# Patient Record
Sex: FEMALE | Race: WHITE | HISPANIC OR LATINO | URBAN - METROPOLITAN AREA
[De-identification: names, ages, dates, MRNs, and addresses within clinical notes are randomized per-mention and may not be internally consistent; named-entity substitution may affect disease eponyms.]

---

## 2021-09-23 ENCOUNTER — OFFICE VISIT (OUTPATIENT)
Dept: URBAN - METROPOLITAN AREA CLINIC 60 | Facility: CLINIC | Age: 76
End: 2021-09-23

## 2021-11-08 ENCOUNTER — OFFICE VISIT (OUTPATIENT)
Dept: URBAN - METROPOLITAN AREA CLINIC 63 | Facility: CLINIC | Age: 76
End: 2021-11-08

## 2022-01-25 LAB — HELICOBACTER PYLORI, UREA BREATH TEST: NOT DETECTED

## 2022-01-26 ENCOUNTER — OFFICE VISIT (OUTPATIENT)
Dept: URBAN - METROPOLITAN AREA CLINIC 63 | Facility: CLINIC | Age: 77
End: 2022-01-26

## 2022-07-09 ENCOUNTER — TELEPHONE ENCOUNTER (OUTPATIENT)
Dept: URBAN - METROPOLITAN AREA CLINIC 121 | Facility: CLINIC | Age: 77
End: 2022-07-09

## 2022-07-09 RX ORDER — ESCITALOPRAM 5 MG/1
TABLET, FILM COATED ORAL
Refills: 0 | OUTPATIENT
Start: 2021-09-16 | End: 2021-11-08

## 2022-07-09 RX ORDER — KETOCONAZOLE 20 MG/G
CREAM TOPICAL
Refills: 0 | OUTPATIENT
Start: 2021-09-16 | End: 2021-11-08

## 2022-07-09 RX ORDER — IRBESARTAN 150 MG/1
TABLET ORAL ONCE A DAY
Refills: 0 | OUTPATIENT
Start: 2021-11-08 | End: 2022-01-26

## 2022-07-09 RX ORDER — ESOMEPRAZOLE MAGNESIUM 20 MG
CAPSULE,DELAYED RELEASE (ENTERIC COATED) ORAL
Refills: 0 | OUTPATIENT
Start: 2021-11-08 | End: 2022-01-26

## 2022-07-09 RX ORDER — TRIAMCINOLONE ACETONIDE 0.25 MG/G
OINTMENT TOPICAL
Refills: 0 | OUTPATIENT
Start: 2021-09-29 | End: 2021-11-08

## 2022-07-09 RX ORDER — SIMETHICONE 62.5 MG
STRIP ORAL AS NEEDED
Refills: 0 | OUTPATIENT
Start: 2021-11-08 | End: 2022-01-26

## 2022-07-09 RX ORDER — FLUTICASONE PROPIONATE 50 UG/1
SPRAY, METERED NASAL
Refills: 0 | OUTPATIENT
Start: 2022-01-12 | End: 2022-01-26

## 2022-07-09 RX ORDER — IRBESARTAN 150 MG/1
TABLET ORAL
Refills: 0 | OUTPATIENT
Start: 2021-09-16 | End: 2021-11-08

## 2022-07-09 RX ORDER — AMLODIPINE BESYLATE 5 MG/1
TABLET ORAL ONCE A DAY
Refills: 0 | OUTPATIENT
Start: 2021-11-08 | End: 2022-01-26

## 2022-07-09 RX ORDER — ESOMEPRAZOLE MAGNESIUM 20 MG
CAPSULE,DELAYED RELEASE (ENTERIC COATED) ORAL
Refills: 0 | OUTPATIENT
Start: 2022-01-26 | End: 2022-01-26

## 2022-07-09 RX ORDER — ALBUTEROL SULFATE 90 UG/1
INHALANT RESPIRATORY (INHALATION)
Refills: 0 | OUTPATIENT
Start: 2022-01-10 | End: 2022-01-26

## 2022-07-09 RX ORDER — CICLOPIROX 80 MG/ML
SOLUTION TOPICAL
Refills: 0 | OUTPATIENT
Start: 2021-11-03 | End: 2021-11-08

## 2022-07-09 RX ORDER — METHYLPREDNISOLONE 4 MG/1
TABLET ORAL
Refills: 0 | OUTPATIENT
Start: 2022-01-19 | End: 2022-01-26

## 2022-07-09 RX ORDER — AMLODIPINE BESYLATE 5 MG/1
TABLET ORAL
Refills: 0 | OUTPATIENT
Start: 2021-09-16 | End: 2021-11-08

## 2022-07-10 ENCOUNTER — TELEPHONE ENCOUNTER (OUTPATIENT)
Dept: URBAN - METROPOLITAN AREA CLINIC 121 | Facility: CLINIC | Age: 77
End: 2022-07-10

## 2022-07-10 RX ORDER — IRBESARTAN 150 MG/1
TABLET ORAL ONCE A DAY
Refills: 0 | Status: ACTIVE | COMMUNITY
Start: 2022-01-26

## 2022-07-10 RX ORDER — SIMETHICONE 62.5 MG
STRIP ORAL AS NEEDED
Refills: 0 | Status: ACTIVE | COMMUNITY
Start: 2022-01-26

## 2022-07-10 RX ORDER — AMLODIPINE BESYLATE 5 MG/1
TABLET ORAL ONCE A DAY
Refills: 0 | Status: ACTIVE | COMMUNITY
Start: 2022-01-26

## 2022-07-10 RX ORDER — ALBUTEROL SULFATE 90 UG/1
INHALANT RESPIRATORY (INHALATION)
Refills: 0 | Status: ACTIVE | COMMUNITY
Start: 2022-01-26

## 2022-07-10 RX ORDER — FLUTICASONE PROPIONATE 50 UG/1
SPRAY, METERED NASAL
Refills: 0 | Status: ACTIVE | COMMUNITY
Start: 2022-01-26

## 2022-07-10 RX ORDER — TIOTROPIUM BROMIDE INHALATION SPRAY 3.12 UG/1
SPRAY, METERED RESPIRATORY (INHALATION)
Refills: 0 | Status: ACTIVE | COMMUNITY
Start: 2022-01-26

## 2022-07-10 RX ORDER — ESCITALOPRAM 5 MG/1
TABLET, FILM COATED ORAL ONCE A DAY
Refills: 0 | Status: ACTIVE | COMMUNITY
Start: 2022-01-26

## 2022-07-10 RX ORDER — METHYLPREDNISOLONE 4 MG/1
TABLET ORAL
Refills: 0 | Status: ACTIVE | COMMUNITY
Start: 2022-01-26

## 2022-07-10 RX ORDER — ESOMEPRAZOLE MAGNESIUM 20 MG
1 EVERY MORNING CAPSULE,DELAYED RELEASE (ENTERIC COATED) ORAL
Refills: 1 | Status: ACTIVE | COMMUNITY
Start: 2022-01-26

## 2022-11-04 ENCOUNTER — WEB ENCOUNTER (OUTPATIENT)
Dept: URBAN - METROPOLITAN AREA CLINIC 60 | Facility: CLINIC | Age: 77
End: 2022-11-04

## 2022-11-04 ENCOUNTER — OFFICE VISIT (OUTPATIENT)
Dept: URBAN - METROPOLITAN AREA CLINIC 60 | Facility: CLINIC | Age: 77
End: 2022-11-04
Payer: COMMERCIAL

## 2022-11-04 VITALS
OXYGEN SATURATION: 96.4 % | WEIGHT: 134 LBS | HEART RATE: 88 BPM | HEIGHT: 63 IN | RESPIRATION RATE: 20 BRPM | BODY MASS INDEX: 23.74 KG/M2 | TEMPERATURE: 96.4 F | SYSTOLIC BLOOD PRESSURE: 120 MMHG | DIASTOLIC BLOOD PRESSURE: 76 MMHG

## 2022-11-04 DIAGNOSIS — K29.50 OTHER CHRONIC GASTRITIS WITHOUT HEMORRHAGE: ICD-10-CM

## 2022-11-04 DIAGNOSIS — Z12.11 SCREEN FOR COLON CANCER: ICD-10-CM

## 2022-11-04 DIAGNOSIS — K29.60 REFLUX GASTRITIS: ICD-10-CM

## 2022-11-04 PROCEDURE — 99214 OFFICE O/P EST MOD 30 MIN: CPT | Performed by: NURSE PRACTITIONER

## 2022-11-04 RX ORDER — AMLODIPINE BESYLATE 5 MG/1
TABLET ORAL ONCE A DAY
Refills: 0 | Status: ACTIVE | COMMUNITY
Start: 2022-01-26

## 2022-11-04 RX ORDER — GABAPENTIN 300 MG/1
1 CAPSULE CAPSULE ORAL ONCE A DAY
Status: ACTIVE | COMMUNITY

## 2022-11-04 RX ORDER — SUCRALFATE 1 G/1
1 TABLET ON AN EMPTY STOMACH TABLET ORAL TWICE A DAY
Qty: 180 TABLET | Refills: 0 | OUTPATIENT
Start: 2022-11-04 | End: 2023-02-01

## 2022-11-04 RX ORDER — PANCRELIPASE 36000; 180000; 114000 [USP'U]/1; [USP'U]/1; [USP'U]/1
AS DIRECTED CAPSULE, DELAYED RELEASE PELLETS ORAL
Status: ACTIVE | COMMUNITY

## 2022-11-04 RX ORDER — POLYVINYL ALCOHOL, POVIDONE 14; 6 MG/ML; MG/ML
AS DIRECTED SOLUTION/ DROPS OPHTHALMIC
Status: ACTIVE | COMMUNITY

## 2022-11-04 RX ORDER — ALBUTEROL SULFATE 90 UG/1
INHALANT RESPIRATORY (INHALATION)
Refills: 0 | Status: ACTIVE | COMMUNITY
Start: 2022-01-26

## 2022-11-04 RX ORDER — IRBESARTAN 150 MG/1
TABLET ORAL ONCE A DAY
Refills: 0 | Status: ACTIVE | COMMUNITY
Start: 2022-01-26

## 2022-11-04 RX ORDER — TIOTROPIUM BROMIDE INHALATION SPRAY 3.12 UG/1
SPRAY, METERED RESPIRATORY (INHALATION)
Refills: 0 | Status: ACTIVE | COMMUNITY
Start: 2022-01-26

## 2022-11-04 RX ORDER — AMOXICILLIN 500 MG/1
1 CAPSULE CAPSULE ORAL
Status: ACTIVE | COMMUNITY

## 2022-11-04 RX ORDER — ESOMEPRAZOLE MAGNESIUM 20 MG
1 EVERY MORNING CAPSULE,DELAYED RELEASE (ENTERIC COATED) ORAL
Refills: 1 | Status: ACTIVE | COMMUNITY
Start: 2022-01-26

## 2022-11-04 RX ORDER — FAMOTIDINE 40 MG/1
1 TABLET AT BEDTIME AS NEEDED TABLET, FILM COATED ORAL ONCE A DAY
Qty: 90 TABLET | Refills: 0 | OUTPATIENT
Start: 2022-11-04

## 2022-11-04 RX ORDER — FLUTICASONE PROPIONATE 50 UG/1
SPRAY, METERED NASAL
Refills: 0 | Status: ACTIVE | COMMUNITY
Start: 2022-01-26

## 2022-11-04 NOTE — HPI-HPI
11/21 Patient seen today at the office with history of IBS s/p cholecystectomy complaining of dyspepsia had an upper endoscopy and colonoscopy in 2019 with evidence of polyp resected, I do have the pathology was done in [facility].  Will recommend repeat colonoscopy in 3 to 5 years from previous one.  In regard of the upper endoscopy patient have gastritis with hiatal hernia I do have again the pathology but have evidence of H. pylori will do we have breast test to check the status if is positive we can do an upper endoscopy and antibiogram. Patient with lactose intolerance. Will continue diet. 1/22: Patient had Urea Breath test that is negative for H pylori. Patient doing well, with good response with creon prn. Will follow as needed basis. Patient will continue low dose PPI. Will follow in 6 months. Patient with GERD on PPI and H pylori resolved will try to wean her off PPI in the near future. 11/22 Patient here today in good general state, he she is complaining of having symptom of gastritis and reflux.  Her symptoms are chronic but now seems to be worse.  Also, patient is a here for her surveillance colonoscopy her last colonoscopy was done 3 years ago with a recall in 3 years.  Patient denies any rectal bleeding or change in her bowel habits. EGD colonoscopy, start on famotidine 40 mg once a day and Carafate 1 g twice a day.  Stop PPI.

## 2022-11-11 ENCOUNTER — LAB OUTSIDE AN ENCOUNTER (OUTPATIENT)
Dept: URBAN - METROPOLITAN AREA CLINIC 60 | Facility: CLINIC | Age: 77
End: 2022-11-11

## 2022-12-05 PROBLEM — 8493009: Status: ACTIVE | Noted: 2022-11-04

## 2022-12-09 ENCOUNTER — CLAIMS CREATED FROM THE CLAIM WINDOW (OUTPATIENT)
Dept: URBAN - METROPOLITAN AREA SURGERY CENTER 4 | Facility: SURGERY CENTER | Age: 77
End: 2022-12-09
Payer: COMMERCIAL

## 2022-12-09 DIAGNOSIS — D12.5 SIGMOID POLYP: ICD-10-CM

## 2022-12-09 DIAGNOSIS — D12.7 BENIGN NEOPLASM OF RECTOSIGMOID JUNCTION: ICD-10-CM

## 2022-12-09 DIAGNOSIS — K64.1 GRADE II INTERNAL HEMORRHOIDS: ICD-10-CM

## 2022-12-09 DIAGNOSIS — D12.3 POLYP OF TRANSVERSE COLON: ICD-10-CM

## 2022-12-09 DIAGNOSIS — Z86.010 COLON POLYP HISTORY: ICD-10-CM

## 2022-12-09 DIAGNOSIS — K57.30 DIVERTCULOSIS OF LG INT W/O PERFORATION OR ABSCESS W/O BLEEDING: ICD-10-CM

## 2022-12-09 PROCEDURE — 45380 COLONOSCOPY AND BIOPSY: CPT | Performed by: INTERNAL MEDICINE

## 2022-12-09 PROCEDURE — 45385 COLONOSCOPY W/LESION REMOVAL: CPT | Performed by: INTERNAL MEDICINE

## 2022-12-09 RX ORDER — AMOXICILLIN 500 MG/1
1 CAPSULE CAPSULE ORAL
Status: ACTIVE | COMMUNITY

## 2022-12-09 RX ORDER — ALBUTEROL SULFATE 90 UG/1
INHALANT RESPIRATORY (INHALATION)
Refills: 0 | Status: ACTIVE | COMMUNITY
Start: 2022-01-26

## 2022-12-09 RX ORDER — TIOTROPIUM BROMIDE INHALATION SPRAY 3.12 UG/1
SPRAY, METERED RESPIRATORY (INHALATION)
Refills: 0 | Status: ACTIVE | COMMUNITY
Start: 2022-01-26

## 2022-12-09 RX ORDER — AMLODIPINE BESYLATE 5 MG/1
TABLET ORAL ONCE A DAY
Refills: 0 | Status: ACTIVE | COMMUNITY
Start: 2022-01-26

## 2022-12-09 RX ORDER — FLUTICASONE PROPIONATE 50 UG/1
SPRAY, METERED NASAL
Refills: 0 | Status: ACTIVE | COMMUNITY
Start: 2022-01-26

## 2022-12-09 RX ORDER — IRBESARTAN 150 MG/1
TABLET ORAL ONCE A DAY
Refills: 0 | Status: ACTIVE | COMMUNITY
Start: 2022-01-26

## 2022-12-09 RX ORDER — FAMOTIDINE 40 MG/1
1 TABLET AT BEDTIME AS NEEDED TABLET, FILM COATED ORAL ONCE A DAY
Qty: 90 TABLET | Refills: 0 | Status: ACTIVE | COMMUNITY
Start: 2022-11-04

## 2022-12-09 RX ORDER — POLYVINYL ALCOHOL, POVIDONE 14; 6 MG/ML; MG/ML
AS DIRECTED SOLUTION/ DROPS OPHTHALMIC
Status: ACTIVE | COMMUNITY

## 2022-12-09 RX ORDER — GABAPENTIN 300 MG/1
1 CAPSULE CAPSULE ORAL ONCE A DAY
Status: ACTIVE | COMMUNITY

## 2022-12-09 RX ORDER — SUCRALFATE 1 G/1
1 TABLET ON AN EMPTY STOMACH TABLET ORAL TWICE A DAY
Qty: 180 TABLET | Refills: 0 | Status: ACTIVE | COMMUNITY
Start: 2022-11-04 | End: 2023-02-01

## 2022-12-09 RX ORDER — PANCRELIPASE 36000; 180000; 114000 [USP'U]/1; [USP'U]/1; [USP'U]/1
AS DIRECTED CAPSULE, DELAYED RELEASE PELLETS ORAL
Status: ACTIVE | COMMUNITY

## 2022-12-09 RX ORDER — ESOMEPRAZOLE MAGNESIUM 20 MG
1 EVERY MORNING CAPSULE,DELAYED RELEASE (ENTERIC COATED) ORAL
Refills: 1 | Status: ACTIVE | COMMUNITY
Start: 2022-01-26

## 2022-12-14 PROBLEM — 398050005 DIVERTICULAR DISEASE OF COLON: Status: ACTIVE | Noted: 2022-12-14

## 2023-01-11 ENCOUNTER — OFFICE VISIT (OUTPATIENT)
Dept: URBAN - METROPOLITAN AREA CLINIC 63 | Facility: CLINIC | Age: 78
End: 2023-01-11
Payer: COMMERCIAL

## 2023-01-11 VITALS
BODY MASS INDEX: 23.04 KG/M2 | HEIGHT: 63 IN | OXYGEN SATURATION: 98 % | HEART RATE: 87 BPM | DIASTOLIC BLOOD PRESSURE: 80 MMHG | TEMPERATURE: 98.2 F | SYSTOLIC BLOOD PRESSURE: 130 MMHG | WEIGHT: 130 LBS

## 2023-01-11 DIAGNOSIS — Z86.010 COLON POLYP HISTORY: ICD-10-CM

## 2023-01-11 DIAGNOSIS — K29.60 REFLUX GASTRITIS: ICD-10-CM

## 2023-01-11 PROBLEM — 428283002 HISTORY OF POLYP OF COLON: Status: ACTIVE | Noted: 2022-12-14

## 2023-01-11 PROBLEM — 57433008: Status: ACTIVE | Noted: 2023-01-11

## 2023-01-11 PROCEDURE — 99213 OFFICE O/P EST LOW 20 MIN: CPT | Performed by: INTERNAL MEDICINE

## 2023-01-11 RX ORDER — GABAPENTIN 300 MG/1
1 CAPSULE CAPSULE ORAL ONCE A DAY
Status: ACTIVE | COMMUNITY

## 2023-01-11 RX ORDER — AMLODIPINE BESYLATE 5 MG/1
1 TABLET TABLET ORAL TWICE A DAY
Refills: 0 | Status: ACTIVE | COMMUNITY
Start: 2022-01-26

## 2023-01-11 RX ORDER — SUCRALFATE 1 G/1
1 TABLET ON AN EMPTY STOMACH TABLET ORAL TWICE A DAY
Qty: 180 TABLET | Refills: 0 | Status: ACTIVE | COMMUNITY
Start: 2022-11-04 | End: 2023-02-01

## 2023-01-11 RX ORDER — FLUTICASONE PROPIONATE 50 UG/1
SPRAY, METERED NASAL
Refills: 0 | Status: ACTIVE | COMMUNITY
Start: 2022-01-26

## 2023-01-11 RX ORDER — ESOMEPRAZOLE MAGNESIUM 20 MG
1 EVERY MORNING CAPSULE,DELAYED RELEASE (ENTERIC COATED) ORAL
Refills: 1 | Status: ACTIVE | COMMUNITY
Start: 2022-01-26

## 2023-01-11 RX ORDER — AMOXICILLIN 500 MG/1
1 CAPSULE CAPSULE ORAL
Status: ACTIVE | COMMUNITY

## 2023-01-11 RX ORDER — PANTOPRAZOLE SODIUM 40 MG/1
1 TABLET TABLET, DELAYED RELEASE ORAL ONCE A DAY
Qty: 30 | Refills: 2 | OUTPATIENT

## 2023-01-11 RX ORDER — PANCRELIPASE 36000; 180000; 114000 [USP'U]/1; [USP'U]/1; [USP'U]/1
AS DIRECTED CAPSULE, DELAYED RELEASE PELLETS ORAL
Status: ACTIVE | COMMUNITY

## 2023-01-11 RX ORDER — FAMOTIDINE 40 MG/1
1 TABLET AT BEDTIME AS NEEDED TABLET, FILM COATED ORAL ONCE A DAY
Qty: 90 TABLET | Refills: 0 | Status: ACTIVE | COMMUNITY
Start: 2022-11-04

## 2023-01-11 RX ORDER — POLYVINYL ALCOHOL, POVIDONE 14; 6 MG/ML; MG/ML
AS DIRECTED SOLUTION/ DROPS OPHTHALMIC
Status: ACTIVE | COMMUNITY

## 2023-01-11 RX ORDER — TIOTROPIUM BROMIDE INHALATION SPRAY 3.12 UG/1
SPRAY, METERED RESPIRATORY (INHALATION)
Refills: 0 | Status: ACTIVE | COMMUNITY
Start: 2022-01-26

## 2023-01-11 RX ORDER — IRBESARTAN 150 MG/1
TABLET ORAL ONCE A DAY
Refills: 0 | Status: ACTIVE | COMMUNITY
Start: 2022-01-26

## 2023-01-11 RX ORDER — ALBUTEROL SULFATE 90 UG/1
INHALANT RESPIRATORY (INHALATION)
Refills: 0 | Status: ACTIVE | COMMUNITY
Start: 2022-01-26

## 2023-01-11 NOTE — HPI-HPI
11/21 Patient seen today at the office with history of IBS s/p cholecystectomy complaining of dyspepsia had an upper endoscopy and colonoscopy in 2019 with evidence of polyp resected, I do have the pathology was done in [facility].  Will recommend repeat colonoscopy in 3 to 5 years from previous one.  In regard of the upper endoscopy patient have gastritis with hiatal hernia I do have again the pathology but have evidence of H. pylori will do we have breast test to check the status if is positive we can do an upper endoscopy and antibiogram. Patient with lactose intolerance. Will continue diet. 1/22: Patient had Urea Breath test that is negative for H pylori. Patient doing well, with good response with creon prn. Will follow as needed basis. Patient will continue low dose PPI. Will follow in 6 months. Patient with GERD on PPI and H pylori resolved will try to wean her off PPI in the near future. 11/22 Patient here today in good general state, he she is complaining of having symptom of gastritis and reflux.  Her symptoms are chronic but now seems to be worse.  Also, patient is a here for her surveillance colonoscopy her last colonoscopy was done 3 years ago with a recall in 3 years.  Patient denies any rectal bleeding or change in her bowel habits. EGD colonoscopy, start on famotidine 40 mg once a day and Carafate 1 g twice a day.  Stop PPI.

## 2023-01-11 NOTE — HPI-TODAY'S VISIT:
1/23: Patient had a upper endoscopy and  colonoscopy.  On December 2022 with impression of distal esophagitis grade B by [place] classification, small hiatal hernia, chronic gastritis, normal duodenum.  Questionable Candida esophagitis, biopsy taken.  Colonoscopy patient was a 10 mm polyp in the rectosigmoid colon removed with hot snare, 5 mm polyp in the sigmoid colon, and 2 small polyp in the hepatic flexure removed with cold biopsy forcep.  2 polyps in the transverse colon removed with cold biopsy forcep.  Mild diverticulosis in the sigmoid colon and internal hemorrhoids pathology report patient was normal small bowel, focal mild chronic gastritis with area of intestinal metaplasia involving antral type mucosa, negative for H. pylori.  Squamous columnar mucosa with reflux changes of active esophagitis and acute and chronic inflammation negative for intestinal metaplasia, dysplasia or malignancy.  Polyps all of them were tubular adenomas was recommended to repeat a colonoscopy in 3 years.  We will do treatment for acid reflux, will consider repeat endoscopy in a year from now because of the finding of intestinal metaplasia.

## 2023-04-25 ENCOUNTER — TELEPHONE ENCOUNTER (OUTPATIENT)
Dept: URBAN - METROPOLITAN AREA CLINIC 64 | Facility: CLINIC | Age: 78
End: 2023-04-25

## 2023-07-25 ENCOUNTER — OFFICE VISIT (OUTPATIENT)
Dept: URBAN - METROPOLITAN AREA CLINIC 63 | Facility: CLINIC | Age: 78
End: 2023-07-25

## 2023-08-09 ENCOUNTER — DASHBOARD ENCOUNTERS (OUTPATIENT)
Age: 78
End: 2023-08-09

## 2023-08-09 ENCOUNTER — WEB ENCOUNTER (OUTPATIENT)
Dept: URBAN - METROPOLITAN AREA CLINIC 63 | Facility: CLINIC | Age: 78
End: 2023-08-09

## 2023-08-09 ENCOUNTER — OFFICE VISIT (OUTPATIENT)
Dept: URBAN - METROPOLITAN AREA CLINIC 63 | Facility: CLINIC | Age: 78
End: 2023-08-09
Payer: COMMERCIAL

## 2023-08-09 ENCOUNTER — LAB OUTSIDE AN ENCOUNTER (OUTPATIENT)
Dept: URBAN - METROPOLITAN AREA CLINIC 63 | Facility: CLINIC | Age: 78
End: 2023-08-09

## 2023-08-09 VITALS
DIASTOLIC BLOOD PRESSURE: 76 MMHG | HEART RATE: 83 BPM | SYSTOLIC BLOOD PRESSURE: 126 MMHG | TEMPERATURE: 96.7 F | OXYGEN SATURATION: 97 % | WEIGHT: 139 LBS | BODY MASS INDEX: 24.63 KG/M2 | HEIGHT: 63 IN

## 2023-08-09 DIAGNOSIS — K64.1 GRADE II HEMORRHOIDS: ICD-10-CM

## 2023-08-09 DIAGNOSIS — Z86.010 COLON POLYP HISTORY: ICD-10-CM

## 2023-08-09 DIAGNOSIS — K29.60 REFLUX GASTRITIS: ICD-10-CM

## 2023-08-09 DIAGNOSIS — K31.A11 INTESTINAL METAPLASIA OF ANTRUM OF STOMACH WITHOUT DYSPLASIA: ICD-10-CM

## 2023-08-09 PROBLEM — 721704005: Status: ACTIVE | Noted: 2023-08-09

## 2023-08-09 PROCEDURE — 99214 OFFICE O/P EST MOD 30 MIN: CPT | Performed by: INTERNAL MEDICINE

## 2023-08-09 RX ORDER — ESOMEPRAZOLE MAGNESIUM 20 MG
1 EVERY MORNING CAPSULE,DELAYED RELEASE (ENTERIC COATED) ORAL
Refills: 1 | Status: ACTIVE | COMMUNITY
Start: 2022-01-26

## 2023-08-09 RX ORDER — POLYVINYL ALCOHOL, POVIDONE 14; 6 MG/ML; MG/ML
AS DIRECTED SOLUTION/ DROPS OPHTHALMIC
Status: ACTIVE | COMMUNITY

## 2023-08-09 RX ORDER — HYDROCORTISONE 25 MG/G
1 APPLICATION CREAM TOPICAL TWICE A DAY
Qty: 1 | Refills: 0 | OUTPATIENT
Start: 2023-08-09

## 2023-08-09 RX ORDER — PANCRELIPASE 36000; 180000; 114000 [USP'U]/1; [USP'U]/1; [USP'U]/1
AS DIRECTED CAPSULE, DELAYED RELEASE PELLETS ORAL
Status: ACTIVE | COMMUNITY

## 2023-08-09 RX ORDER — FLUTICASONE PROPIONATE 50 UG/1
SPRAY, METERED NASAL
Refills: 0 | Status: ACTIVE | COMMUNITY
Start: 2022-01-26

## 2023-08-09 RX ORDER — GABAPENTIN 300 MG/1
1 CAPSULE CAPSULE ORAL ONCE A DAY
Status: ACTIVE | COMMUNITY

## 2023-08-09 RX ORDER — TIOTROPIUM BROMIDE INHALATION SPRAY 3.12 UG/1
SPRAY, METERED RESPIRATORY (INHALATION)
Refills: 0 | Status: ACTIVE | COMMUNITY
Start: 2022-01-26

## 2023-08-09 RX ORDER — PANTOPRAZOLE SODIUM 40 MG/1
1 TABLET TABLET, DELAYED RELEASE ORAL ONCE A DAY
Qty: 30 | Refills: 2 | Status: ACTIVE | COMMUNITY

## 2023-08-09 RX ORDER — AMOXICILLIN 500 MG/1
1 CAPSULE CAPSULE ORAL
Status: ACTIVE | COMMUNITY

## 2023-08-09 RX ORDER — ALBUTEROL SULFATE 90 UG/1
INHALANT RESPIRATORY (INHALATION)
Refills: 0 | Status: ACTIVE | COMMUNITY
Start: 2022-01-26

## 2023-08-09 RX ORDER — AMLODIPINE BESYLATE 5 MG/1
1 TABLET TABLET ORAL TWICE A DAY
Refills: 0 | Status: ACTIVE | COMMUNITY
Start: 2022-01-26

## 2023-08-09 RX ORDER — IRBESARTAN 150 MG/1
TABLET ORAL ONCE A DAY
Refills: 0 | Status: ACTIVE | COMMUNITY
Start: 2022-01-26

## 2023-08-09 RX ORDER — FAMOTIDINE 40 MG/1
1 TABLET AT BEDTIME AS NEEDED TABLET, FILM COATED ORAL ONCE A DAY
Qty: 90 TABLET | Refills: 0 | Status: ACTIVE | COMMUNITY
Start: 2022-11-04

## 2023-08-09 NOTE — HPI-TODAY'S VISIT:
1/23: Patient had a upper endoscopy and  colonoscopy.  On December 2022 with impression of distal esophagitis grade B by [place] classification, small hiatal hernia, chronic gastritis, normal duodenum.  Questionable Candida esophagitis, biopsy taken.  Colonoscopy patient was a 10 mm polyp in the rectosigmoid colon removed with hot snare, 5 mm polyp in the sigmoid colon, and 2 small polyp in the hepatic flexure removed with cold biopsy forcep.  2 polyps in the transverse colon removed with cold biopsy forcep.  Mild diverticulosis in the sigmoid colon and internal hemorrhoids pathology report patient was normal small bowel, focal mild chronic gastritis with area of intestinal metaplasia involving antral type mucosa, negative for H. pylori.  Squamous columnar mucosa with reflux changes of active esophagitis and acute and chronic inflammation negative for intestinal metaplasia, dysplasia or malignancy.  Polyps all of them were tubular adenomas was recommended to repeat a colonoscopy in 3 years.  We will do treatment for acid reflux, will consider repeat endoscopy in a year from now because of the finding of intestinal metaplasia. 8/23: Patient comes with symptoms of reflux and she is doing her diet,  with epigastric discomfort and bloating, will do EGD and will continue nexium and will do topical treatment for hemorrhoids. (states had irritation of the hemorrhoids)  Will folow closely. Patient states having regular BM. WIll conisder stool studies if patient recur with episode of diarrhea, that now has resolved. But has chronic dyspepsia. With personal h/o intestinal metaplasia will do EGD.

## 2023-08-09 NOTE — PHYSICAL EXAM GASTROINTESTINAL
Abdomen , soft, nontender, mild discomfort in the epigastrium to deep palpation, nondistended , no guarding or rigidity , no masses palpable , normal bowel sounds , Liver and Spleen,  no hepatosplenomegaly , liver nontender

## 2023-08-09 NOTE — HPI-HPI
11/21 Patient seen today at the office with history of IBS s/p cholecystectomy complaining of dyspepsia had an upper endoscopy and colonoscopy in 2019 with evidence of polyp resected, I do have the pathology was done in [facility].  Will recommend repeat colonoscopy in 3 to 5 years from previous one.  In regard of the upper endoscopy patient have gastritis with hiatal hernia I do have again the pathology but have evidence of H. pylori will do we have breast test to check the status if is positive we can do an upper endoscopy and antibiogram. Patient with lactose intolerance. Will continue diet. 1/22: Patient had Urea Breath test that is negative for H pylori. Patient doing well, with good response with creon prn. Will follow as needed basis. Patient will continue low dose PPI. Will follow in 6 months. Patient with GERD on PPI and H pylori resolved will try to wean her off PPI in the near future. 11/22 Patient here today in good general state, he she is complaining of having symptom of gastritis and reflux.  Her symptoms are chronic but now seems to be worse.  Also, patient is a here for her surveillance colonoscopy her last colonoscopy was done 3 years ago with a recall in 3 years.  Patient denies any rectal bleeding or change in her bowel habits. EGD colonoscopy, start on famotidine 40 mg once a day and Carafate 1 g twice a day.  Stop PPI.

## 2023-09-29 ENCOUNTER — OFFICE VISIT (OUTPATIENT)
Dept: URBAN - METROPOLITAN AREA SURGERY CENTER 4 | Facility: SURGERY CENTER | Age: 78
End: 2023-09-29
Payer: COMMERCIAL

## 2023-09-29 DIAGNOSIS — K29.60 REFLUX GASTRITIS: ICD-10-CM

## 2023-09-29 DIAGNOSIS — R68.89 ERYTHEMATOUS MUCOSA: ICD-10-CM

## 2023-09-29 DIAGNOSIS — K21.9 GASTRO-ESOPHAGEAL REFLUX DISEASE WITHOUT ESOPHAGITIS: ICD-10-CM

## 2023-09-29 DIAGNOSIS — K21.9 ESOPHAGEAL REFLUX: ICD-10-CM

## 2023-09-29 DIAGNOSIS — K29.50 OTHER CHRONIC GASTRITIS WITHOUT HEMORRHAGE: ICD-10-CM

## 2023-09-29 DIAGNOSIS — K44.9 HIATAL HERNIA: ICD-10-CM

## 2023-09-29 DIAGNOSIS — R19.8 OTHER SPECIFIED SYMPTOMS AND SIGNS INVOLVING THE DIGESTIVE SYSTEM AND ABDOMEN: ICD-10-CM

## 2023-09-29 PROCEDURE — 43239 EGD BIOPSY SINGLE/MULTIPLE: CPT | Performed by: INTERNAL MEDICINE

## 2023-09-29 PROCEDURE — 99100 ANES PT EXTEME AGE<1 YR&>70: CPT | Performed by: NURSE ANESTHETIST, CERTIFIED REGISTERED

## 2023-09-29 PROCEDURE — 00731 ANES UPR GI NDSC PX NOS: CPT | Performed by: NURSE ANESTHETIST, CERTIFIED REGISTERED

## 2023-09-29 RX ORDER — FAMOTIDINE 40 MG/1
1 TABLET AT BEDTIME AS NEEDED TABLET, FILM COATED ORAL ONCE A DAY
Qty: 90 TABLET | Refills: 0 | Status: ACTIVE | COMMUNITY
Start: 2022-11-04

## 2023-09-29 RX ORDER — AMLODIPINE BESYLATE 5 MG/1
1 TABLET TABLET ORAL TWICE A DAY
Refills: 0 | Status: ACTIVE | COMMUNITY
Start: 2022-01-26

## 2023-09-29 RX ORDER — GABAPENTIN 300 MG/1
1 CAPSULE CAPSULE ORAL ONCE A DAY
Status: ACTIVE | COMMUNITY

## 2023-09-29 RX ORDER — AMOXICILLIN 500 MG/1
1 CAPSULE CAPSULE ORAL
Status: ACTIVE | COMMUNITY

## 2023-09-29 RX ORDER — PANTOPRAZOLE SODIUM 40 MG/1
1 TABLET TABLET, DELAYED RELEASE ORAL ONCE A DAY
Qty: 30 | Refills: 2 | Status: ACTIVE | COMMUNITY

## 2023-09-29 RX ORDER — POLYVINYL ALCOHOL, POVIDONE 14; 6 MG/ML; MG/ML
AS DIRECTED SOLUTION/ DROPS OPHTHALMIC
Status: ACTIVE | COMMUNITY

## 2023-09-29 RX ORDER — HYDROCORTISONE 25 MG/G
1 APPLICATION CREAM TOPICAL TWICE A DAY
Qty: 1 | Refills: 0 | Status: ACTIVE | COMMUNITY
Start: 2023-08-09

## 2023-09-29 RX ORDER — ESOMEPRAZOLE MAGNESIUM 20 MG
1 EVERY MORNING CAPSULE,DELAYED RELEASE (ENTERIC COATED) ORAL
Refills: 1 | Status: ACTIVE | COMMUNITY
Start: 2022-01-26

## 2023-09-29 RX ORDER — IRBESARTAN 150 MG/1
TABLET ORAL ONCE A DAY
Refills: 0 | Status: ACTIVE | COMMUNITY
Start: 2022-01-26

## 2023-09-29 RX ORDER — ALBUTEROL SULFATE 90 UG/1
INHALANT RESPIRATORY (INHALATION)
Refills: 0 | Status: ACTIVE | COMMUNITY
Start: 2022-01-26

## 2023-09-29 RX ORDER — FLUTICASONE PROPIONATE 50 UG/1
SPRAY, METERED NASAL
Refills: 0 | Status: ACTIVE | COMMUNITY
Start: 2022-01-26

## 2023-09-29 RX ORDER — TIOTROPIUM BROMIDE INHALATION SPRAY 3.12 UG/1
SPRAY, METERED RESPIRATORY (INHALATION)
Refills: 0 | Status: ACTIVE | COMMUNITY
Start: 2022-01-26

## 2023-09-29 RX ORDER — PANCRELIPASE 36000; 180000; 114000 [USP'U]/1; [USP'U]/1; [USP'U]/1
AS DIRECTED CAPSULE, DELAYED RELEASE PELLETS ORAL
Status: ACTIVE | COMMUNITY

## 2023-10-17 ENCOUNTER — OFFICE VISIT (OUTPATIENT)
Dept: URBAN - METROPOLITAN AREA CLINIC 64 | Facility: CLINIC | Age: 78
End: 2023-10-17

## 2024-06-20 ENCOUNTER — TELEPHONE ENCOUNTER (OUTPATIENT)
Dept: URBAN - METROPOLITAN AREA CLINIC 63 | Facility: CLINIC | Age: 79
End: 2024-06-20

## 2024-06-26 ENCOUNTER — OFFICE VISIT (OUTPATIENT)
Dept: URBAN - METROPOLITAN AREA CLINIC 60 | Facility: CLINIC | Age: 79
End: 2024-06-26

## 2024-08-19 ENCOUNTER — OFFICE VISIT (OUTPATIENT)
Dept: URBAN - METROPOLITAN AREA CLINIC 60 | Facility: CLINIC | Age: 79
End: 2024-08-19

## 2024-09-25 ENCOUNTER — OFFICE VISIT (OUTPATIENT)
Dept: URBAN - METROPOLITAN AREA CLINIC 63 | Facility: CLINIC | Age: 79
End: 2024-09-25
Payer: COMMERCIAL

## 2024-09-25 VITALS
WEIGHT: 137 LBS | BODY MASS INDEX: 24.27 KG/M2 | HEIGHT: 63 IN | TEMPERATURE: 98 F | OXYGEN SATURATION: 98 % | HEART RATE: 97 BPM | SYSTOLIC BLOOD PRESSURE: 120 MMHG | DIASTOLIC BLOOD PRESSURE: 76 MMHG

## 2024-09-25 DIAGNOSIS — K57.30 DIVERTCULOSIS OF LG INT W/O PERFORATION OR ABSCESS W/O BLEEDING: ICD-10-CM

## 2024-09-25 DIAGNOSIS — K29.60 REFLUX GASTRITIS: ICD-10-CM

## 2024-09-25 DIAGNOSIS — K31.A11 INTESTINAL METAPLASIA OF ANTRUM OF STOMACH WITHOUT DYSPLASIA: ICD-10-CM

## 2024-09-25 PROCEDURE — 99214 OFFICE O/P EST MOD 30 MIN: CPT | Performed by: INTERNAL MEDICINE

## 2024-09-25 RX ORDER — FLUTICASONE PROPIONATE 50 UG/1
SPRAY, METERED NASAL
Refills: 0 | Status: ACTIVE | COMMUNITY
Start: 2022-01-26

## 2024-09-25 RX ORDER — HYDROCORTISONE 25 MG/G
1 APPLICATION CREAM TOPICAL TWICE A DAY
Qty: 1 | Refills: 0 | Status: ACTIVE | COMMUNITY
Start: 2023-08-09

## 2024-09-25 RX ORDER — IRBESARTAN 150 MG/1
TABLET ORAL ONCE A DAY
Refills: 0 | Status: ACTIVE | COMMUNITY
Start: 2022-01-26

## 2024-09-25 RX ORDER — FAMOTIDINE 40 MG/1
1 TABLET AT BEDTIME AS NEEDED TABLET, FILM COATED ORAL ONCE A DAY
Qty: 90 TABLET | Refills: 0 | Status: ACTIVE | COMMUNITY
Start: 2022-11-04

## 2024-09-25 RX ORDER — AMOXICILLIN 500 MG/1
1 CAPSULE CAPSULE ORAL
Status: ACTIVE | COMMUNITY

## 2024-09-25 RX ORDER — POLYVINYL ALCOHOL, POVIDONE 14; 6 MG/ML; MG/ML
AS DIRECTED SOLUTION/ DROPS OPHTHALMIC
Status: ACTIVE | COMMUNITY

## 2024-09-25 RX ORDER — ESOMEPRAZOLE MAGNESIUM 20 MG/1
1 EVERY MORNING CAPSULE, DELAYED RELEASE ORAL
Refills: 1 | Status: ACTIVE | COMMUNITY
Start: 2022-01-26

## 2024-09-25 RX ORDER — SUCRALFATE 1 G/1
1 TABLET ON AN EMPTY STOMACH TABLET ORAL TWICE A DAY
Qty: 180 TABLET | Refills: 3 | OUTPATIENT
Start: 2024-09-25 | End: 2025-09-20

## 2024-09-25 RX ORDER — TIOTROPIUM BROMIDE INHALATION SPRAY 3.12 UG/1
SPRAY, METERED RESPIRATORY (INHALATION)
Refills: 0 | Status: ACTIVE | COMMUNITY
Start: 2022-01-26

## 2024-09-25 RX ORDER — PANCRELIPASE 36000; 180000; 114000 [USP'U]/1; [USP'U]/1; [USP'U]/1
AS DIRECTED CAPSULE, DELAYED RELEASE PELLETS ORAL
Status: ACTIVE | COMMUNITY

## 2024-09-25 RX ORDER — PANTOPRAZOLE SODIUM 40 MG/1
1 TABLET TABLET, DELAYED RELEASE ORAL ONCE A DAY
Qty: 30 | Refills: 2 | Status: ACTIVE | COMMUNITY

## 2024-09-25 RX ORDER — GABAPENTIN 300 MG/1
1 CAPSULE CAPSULE ORAL ONCE A DAY
Status: ACTIVE | COMMUNITY

## 2024-09-25 RX ORDER — AMLODIPINE BESYLATE 5 MG/1
1 TABLET TABLET ORAL TWICE A DAY
Refills: 0 | Status: ACTIVE | COMMUNITY
Start: 2022-01-26

## 2024-09-25 RX ORDER — ALBUTEROL SULFATE 90 UG/1
INHALANT RESPIRATORY (INHALATION)
Refills: 0 | Status: ACTIVE | COMMUNITY
Start: 2022-01-26

## 2024-09-25 RX ORDER — ESOMEPRAZOLE MAGNESIUM 20 MG/1
1 EVERY MORNING CAPSULE, DELAYED RELEASE ORAL
Qty: 30 | Refills: 3 | OUTPATIENT
Start: 2022-01-26

## 2024-09-25 NOTE — HPI-HPI
11/21 Patient seen today at the office with history of IBS s/p cholecystectomy complaining of dyspepsia had an upper endoscopy and colonoscopy in 2019 with evidence of polyp resected, I do have the pathology was done in [facility].  Will recommend repeat colonoscopy in 3 to 5 years from previous one.  In regard of the upper endoscopy patient have gastritis with hiatal hernia I do have again the pathology but have evidence of H. pylori will do we have breast test to check the status if is positive we can do an upper endoscopy and antibiogram. Patient with lactose intolerance. Will continue diet. 1/22: Patient had Urea Breath test that is negative for H pylori. Patient doing well, with good response with creon prn. Will follow as needed basis. Patient will continue low dose PPI. Will follow in 6 months. Patient with GERD on PPI and H pylori resolved will try to wean her off PPI in the near future. 11/22 Patient here today in good general state, he she is complaining of having symptom of gastritis and reflux.  Her symptoms are chronic but now seems to be worse.  Also, patient is a here for her surveillance colonoscopy her last colonoscopy was done 3 years ago with a recall in 3 years.  Patient denies any rectal bleeding or change in her bowel habits. EGD colonoscopy, start on famotidine 40 mg once a day and Carafate 1 g twice a day.  Stop PPI.

## 2024-09-25 NOTE — HPI-TODAY'S VISIT:
1/23: Patient had a upper endoscopy and  colonoscopy.  On December 2022 with impression of distal esophagitis grade B by [place] classification, small hiatal hernia, chronic gastritis, normal duodenum.  Questionable Candida esophagitis, biopsy taken.  Colonoscopy patient was a 10 mm polyp in the rectosigmoid colon removed with hot snare, 5 mm polyp in the sigmoid colon, and 2 small polyp in the hepatic flexure removed with cold biopsy forcep.  2 polyps in the transverse colon removed with cold biopsy forcep.  Mild diverticulosis in the sigmoid colon and internal hemorrhoids pathology report patient was normal small bowel, focal mild chronic gastritis with area of intestinal metaplasia involving antral type mucosa, negative for H. pylori.  Squamous columnar mucosa with reflux changes of active esophagitis and acute and chronic inflammation negative for intestinal metaplasia, dysplasia or malignancy.  Polyps all of them were tubular adenomas was recommended to repeat a colonoscopy in 3 years.  We will do treatment for acid reflux, will consider repeat endoscopy in a year from now because of the finding of intestinal metaplasia. 8/23: Patient comes with symptoms of reflux and she is doing her diet,  with epigastric discomfort and bloating, will do EGD and will continue nexium and will do topical treatment for hemorrhoids. (states had irritation of the hemorrhoids)  Will folow closely. Patient states having regular BM. WIll conisder stool studies if patient recur with episode of diarrhea, that now has resolved. But has chronic dyspepsia. With personal h/o intestinal metaplasia will do EGD. 9/24: Patient had an upper endoscopy done in September 2023 with impression of a normal esophagus, small hiatal hernia, chronic bile gastritis, and normal duodenum.  Pathology report shows small bowel duodenal type mucosa with normal villous pattern and without a specific histopathological findings.  Focal mild chronic gastritis with focal intestinal metaplasia negative for H. pylori at the antrum and biopsy of the body of the stomach show focal minimal chronic gastritis without intestinal metaplasia, negative for H. pylori.  Distal esophagus with squamous mucosa with mild reflux type changes and focal mild chronic inflammation negative for intestinal metaplasia.  With this finding patient have focal intestinal metaplasia of the antrum of the stomach with evidence of bile reflux we will do treatment and may consider to repeat an endoscopy in 1 to 3 years from last endoscopy for surveillance.  Understanding that there is no dysplasia on her last endoscopy.  Mapping of the stomach show evidence of intestinal metaplasia at the antrum of the stomach. Patient with flares of gastritis when she drinks wine.

## 2025-01-27 ENCOUNTER — TELEPHONE ENCOUNTER (OUTPATIENT)
Dept: URBAN - METROPOLITAN AREA CLINIC 63 | Facility: CLINIC | Age: 80
End: 2025-01-27

## 2025-01-27 ENCOUNTER — LAB OUTSIDE AN ENCOUNTER (OUTPATIENT)
Dept: URBAN - METROPOLITAN AREA CLINIC 63 | Facility: CLINIC | Age: 80
End: 2025-01-27

## 2025-01-27 ENCOUNTER — OFFICE VISIT (OUTPATIENT)
Dept: URBAN - METROPOLITAN AREA CLINIC 63 | Facility: CLINIC | Age: 80
End: 2025-01-27
Payer: COMMERCIAL

## 2025-01-27 VITALS
TEMPERATURE: 98.7 F | BODY MASS INDEX: 23.04 KG/M2 | OXYGEN SATURATION: 98 % | DIASTOLIC BLOOD PRESSURE: 89 MMHG | WEIGHT: 130 LBS | SYSTOLIC BLOOD PRESSURE: 130 MMHG | HEART RATE: 96 BPM | HEIGHT: 63 IN

## 2025-01-27 DIAGNOSIS — K31.A11 INTESTINAL METAPLASIA OF ANTRUM OF STOMACH WITHOUT DYSPLASIA: ICD-10-CM

## 2025-01-27 DIAGNOSIS — K29.60 REFLUX GASTRITIS: ICD-10-CM

## 2025-01-27 DIAGNOSIS — Z12.11 COLON CANCER SCREENING: ICD-10-CM

## 2025-01-27 PROCEDURE — 99214 OFFICE O/P EST MOD 30 MIN: CPT | Performed by: INTERNAL MEDICINE

## 2025-01-27 RX ORDER — OMEGA-3-ACID ETHYL ESTERS 1 G/1
TAKE 1 CAPSULE BY MOUTH BY MOUTH IN THE MORNING AND AT BEDTIME CAPSULE, LIQUID FILLED ORAL
Qty: 60 EACH | Refills: 0 | Status: ACTIVE | COMMUNITY

## 2025-01-27 RX ORDER — SUCRALFATE 1 G/1
TAKE 1 TABLET BY MOUTH TWICE DAILY ON AN EMPTY STOMACH TABLET ORAL
Qty: 180 EACH | Refills: 1 | Status: ACTIVE | COMMUNITY

## 2025-01-27 RX ORDER — ESOMEPRAZOLE MAGNESIUM 40 MG/1
CAPSULE, DELAYED RELEASE ORAL
Qty: 90 EACH | Refills: 0 | Status: ACTIVE | COMMUNITY

## 2025-01-27 RX ORDER — IRBESARTAN AND HYDROCHLOROTHIAZIDE 150; 12.5 MG/1; MG/1
TAKE 1 TABLET BY MOUTH 1 TIME EACH DAY TABLET ORAL
Qty: 90 EACH | Refills: 0 | Status: ACTIVE | COMMUNITY

## 2025-01-27 RX ORDER — OMEGA-3-ACID ETHYL ESTERS 1 G/1
CAPSULE, LIQUID FILLED ORAL
Qty: 60 APPLICATOR | Refills: 0 | Status: ACTIVE | COMMUNITY

## 2025-01-27 RX ORDER — FLUTICASONE FUROATE 100 UG/1
POWDER RESPIRATORY (INHALATION)
Qty: 30 APPLICATOR | Refills: 4 | Status: ACTIVE | COMMUNITY

## 2025-01-27 RX ORDER — NEOMYCIN SULFATE, POLYMYXIN B SULFATE, AND DEXAMETHASONE 3.5; 10000; 1 MG/G; [USP'U]/G; MG/G
APPLY TO BOTH EYE LID MARGINS AT BEDTIME OINTMENT OPHTHALMIC
Qty: 3.5 GRAM | Refills: 0 | Status: ACTIVE | COMMUNITY

## 2025-01-27 RX ORDER — AMLODIPINE BESYLATE 5 MG/1
TABLET ORAL
Qty: 90 EACH | Refills: 0 | Status: ACTIVE | COMMUNITY

## 2025-01-27 RX ORDER — FLUTICASONE FUROATE 100 UG/1
INHALE 1 PUFF BY MOUTH 1 TIME EACH DAY. RINSE MOUTH WITH WATER AFTER USE FOR AFTERTASTE AND INCIDENCE OF CANDIDIASIS. DO NOT SWALLOW POWDER RESPIRATORY (INHALATION)
Qty: 30 EACH | Refills: 4 | Status: ACTIVE | COMMUNITY

## 2025-01-27 NOTE — HPI-TODAY'S VISIT:
1/23: Patient had a upper endoscopy and  colonoscopy.  On December 2022 with impression of distal esophagitis grade B by [place] classification, small hiatal hernia, chronic gastritis, normal duodenum.  Questionable Candida esophagitis, biopsy taken.  Colonoscopy patient was a 10 mm polyp in the rectosigmoid colon removed with hot snare, 5 mm polyp in the sigmoid colon, and 2 small polyp in the hepatic flexure removed with cold biopsy forcep.  2 polyps in the transverse colon removed with cold biopsy forcep.  Mild diverticulosis in the sigmoid colon and internal hemorrhoids pathology report patient was normal small bowel, focal mild chronic gastritis with area of intestinal metaplasia involving antral type mucosa, negative for H. pylori.  Squamous columnar mucosa with reflux changes of active esophagitis and acute and chronic inflammation negative for intestinal metaplasia, dysplasia or malignancy.  Polyps all of them were tubular adenomas was recommended to repeat a colonoscopy in 3 years.  We will do treatment for acid reflux, will consider repeat endoscopy in a year from now because of the finding of intestinal metaplasia. 8/23: Patient comes with symptoms of reflux and she is doing her diet,  with epigastric discomfort and bloating, will do EGD and will continue nexium and will do topical treatment for hemorrhoids. (states had irritation of the hemorrhoids)  Will folow closely. Patient states having regular BM. WIll conisder stool studies if patient recur with episode of diarrhea, that now has resolved. But has chronic dyspepsia. With personal h/o intestinal metaplasia will do EGD. 9/24: Patient had an upper endoscopy done in September 2023 with impression of a normal esophagus, small hiatal hernia, chronic bile gastritis, and normal duodenum.  Pathology report shows small bowel duodenal type mucosa with normal villous pattern and without a specific histopathological findings.  Focal mild chronic gastritis with focal intestinal metaplasia negative for H. pylori at the antrum and biopsy of the body of the stomach show focal minimal chronic gastritis without intestinal metaplasia, negative for H. pylori.  Distal esophagus with squamous mucosa with mild reflux type changes and focal mild chronic inflammation negative for intestinal metaplasia.  With this finding patient have focal intestinal metaplasia of the antrum of the stomach with evidence of bile reflux we will do treatment and may consider to repeat an endoscopy in 1 to 3 years from last endoscopy for surveillance.  Understanding that there is no dysplasia on her last endoscopy.  Mapping of the stomach show evidence of intestinal metaplasia at the antrum of the stomach. Patient with flares of gastritis when she drinks wine. 1/25; Patient with occasional dyspepsia and with personal h/o gastric intestinal metaplasia, will continue treatment and will do EGD/ [place] follow closely.

## 2025-01-27 NOTE — HPI-HPI
11/21 Patient seen today at the office with history of IBS s/p cholecystectomy complaining of dyspepsia had an upper endoscopy and colonoscopy in 2019 with evidence of polyp resected, I do have the pathology was done in [facility].  Will recommend repeat colonoscopy in 3 to 5 years from previous one.  In regard of the upper endoscopy patient have gastritis with hiatal hernia I do have again the pathology but have evidence of H. pylori will do we have breast test to check the status if is positive we can do an upper endoscopy and antibiogram. Patient with lactose intolerance. Will continue diet. 1/22: Patient had Urea Breath test that is negative for H pylori. Patient doing well, with good response with creon prn. Will follow as needed basis. Patient will continue low dose PPI. Will follow in 6 months. Patient with GERD on PPI and H pylori resolved will try to wean her off PPI in the near future. 11/22 Patient here today in good general state, he she is complaining of having symptom of gastritis and reflux.  Her symptoms are chronic but now seems to be worse.  Also, patient is a here for her surveillance colonoscopy her last colonoscopy was done 3 years ago with a recall in 3 years.  Patient denies any rectal bleeding or change in her bowel habits. EGD colonoscopy, start on famotidine 40 mg once a day and Carafate 1 g twice a day.  Stop PPI.

## 2025-01-28 ENCOUNTER — LAB OUTSIDE AN ENCOUNTER (OUTPATIENT)
Dept: URBAN - METROPOLITAN AREA CLINIC 63 | Facility: CLINIC | Age: 80
End: 2025-01-28

## 2025-04-08 ENCOUNTER — TELEPHONE ENCOUNTER (OUTPATIENT)
Dept: URBAN - METROPOLITAN AREA CLINIC 63 | Facility: CLINIC | Age: 80
End: 2025-04-08

## 2025-04-15 ENCOUNTER — OFFICE VISIT (OUTPATIENT)
Dept: URBAN - METROPOLITAN AREA SURGERY CENTER 4 | Facility: SURGERY CENTER | Age: 80
End: 2025-04-15

## 2025-06-16 ENCOUNTER — OFFICE VISIT (OUTPATIENT)
Dept: URBAN - METROPOLITAN AREA CLINIC 63 | Facility: CLINIC | Age: 80
End: 2025-06-16

## 2025-07-02 ENCOUNTER — OFFICE VISIT (OUTPATIENT)
Dept: URBAN - METROPOLITAN AREA SURGERY CENTER 4 | Facility: SURGERY CENTER | Age: 80
End: 2025-07-02

## 2025-07-02 RX ORDER — OMEGA-3-ACID ETHYL ESTERS 1 G/1
TAKE 1 CAPSULE BY MOUTH BY MOUTH IN THE MORNING AND AT BEDTIME CAPSULE, LIQUID FILLED ORAL
Qty: 60 EACH | Refills: 0 | Status: ACTIVE | COMMUNITY

## 2025-07-02 RX ORDER — SUCRALFATE 1 G/1
TAKE 1 TABLET BY MOUTH TWICE DAILY ON AN EMPTY STOMACH TABLET ORAL
Qty: 180 EACH | Refills: 1 | Status: ACTIVE | COMMUNITY

## 2025-07-02 RX ORDER — FLUTICASONE FUROATE 100 UG/1
POWDER RESPIRATORY (INHALATION)
Qty: 30 APPLICATOR | Refills: 4 | Status: ACTIVE | COMMUNITY

## 2025-07-02 RX ORDER — AMLODIPINE BESYLATE 5 MG/1
TABLET ORAL
Qty: 90 EACH | Refills: 0 | Status: ACTIVE | COMMUNITY

## 2025-07-02 RX ORDER — ESOMEPRAZOLE MAGNESIUM 40 MG/1
CAPSULE, DELAYED RELEASE ORAL
Qty: 90 EACH | Refills: 0 | Status: ACTIVE | COMMUNITY

## 2025-07-02 RX ORDER — NEOMYCIN SULFATE, POLYMYXIN B SULFATE, AND DEXAMETHASONE 3.5; 10000; 1 MG/G; [USP'U]/G; MG/G
APPLY TO BOTH EYE LID MARGINS AT BEDTIME OINTMENT OPHTHALMIC
Qty: 3.5 GRAM | Refills: 0 | Status: ACTIVE | COMMUNITY

## 2025-07-02 RX ORDER — OMEGA-3-ACID ETHYL ESTERS 1 G/1
CAPSULE, LIQUID FILLED ORAL
Qty: 60 APPLICATOR | Refills: 0 | Status: ACTIVE | COMMUNITY

## 2025-07-02 RX ORDER — IRBESARTAN AND HYDROCHLOROTHIAZIDE 150; 12.5 MG/1; MG/1
TAKE 1 TABLET BY MOUTH 1 TIME EACH DAY TABLET ORAL
Qty: 90 EACH | Refills: 0 | Status: ACTIVE | COMMUNITY

## 2025-07-02 RX ORDER — FLUTICASONE FUROATE 100 UG/1
INHALE 1 PUFF BY MOUTH 1 TIME EACH DAY. RINSE MOUTH WITH WATER AFTER USE FOR AFTERTASTE AND INCIDENCE OF CANDIDIASIS. DO NOT SWALLOW POWDER RESPIRATORY (INHALATION)
Qty: 30 EACH | Refills: 4 | Status: ACTIVE | COMMUNITY